# Patient Record
Sex: MALE | Race: WHITE | NOT HISPANIC OR LATINO | Employment: OTHER | URBAN - METROPOLITAN AREA
[De-identification: names, ages, dates, MRNs, and addresses within clinical notes are randomized per-mention and may not be internally consistent; named-entity substitution may affect disease eponyms.]

---

## 2023-08-18 PROBLEM — R22.1 NECK MASS: Status: ACTIVE | Noted: 2023-08-18

## 2023-08-18 PROBLEM — H90.3 SENSORINEURAL HEARING LOSS, ASYMMETRICAL: Status: ACTIVE | Noted: 2023-08-18

## 2023-08-18 PROBLEM — H90.3 BILATERAL SENSORINEURAL HEARING LOSS: Status: ACTIVE | Noted: 2023-08-18

## 2023-08-18 PROBLEM — J34.2 DEVIATED SEPTUM: Status: ACTIVE | Noted: 2023-08-18

## 2023-08-18 RX ORDER — LISINOPRIL 10 MG/1
10 TABLET ORAL
COMMUNITY
Start: 2018-04-13

## 2023-08-18 RX ORDER — ASPIRIN 81 MG/1
81 TABLET ORAL
COMMUNITY
Start: 2015-12-15

## 2023-08-18 RX ORDER — GABAPENTIN 300 MG/1
300 CAPSULE ORAL
COMMUNITY
Start: 2018-05-15

## 2023-08-18 RX ORDER — METFORMIN HYDROCHLORIDE 500 MG/1
500 TABLET ORAL
COMMUNITY
Start: 2018-02-07

## 2023-08-18 RX ORDER — ALLOPURINOL 300 MG/1
TABLET ORAL
COMMUNITY
Start: 2020-06-10

## 2023-10-21 NOTE — PROGRESS NOTES
Subjective   Patient ID: Anthony Escobar is a 73 y.o. male who presents for Follow-up, Hearing Loss, and Cerumen Impaction.  HPI  his 72-year-old male is being seen today for recheck of his years. In December of last year he was noted on a hearing test to have an asymmetric loss with poor discrimination ability especially on his left side. An MRI scan was done showing no central nervous system abnormalities. He did subsequently obtain hearing aids which she has been having some difficulties with due to cerumen buildup as well as fit. He does find that when he wears them they do help somewhat with his understanding. There is been no pain or drainage from his ears. He denies any persistent vertigo. General health has been otherwise stable.       Objective   Physical Exam  EXAMINATION:     GENERAL JOSE.EARANCE: Alert, in no acute distress, normal pitch and clarity of voice, well-developed and nourished, cooperative.     HEAD/FACE: Normocephalic, atraumatic, normal facial movements and strength, no no tenderness to palpation, no lesions noted.     SKIN: Normal turgor, no raised or ulcerative lesions, warm and dry to palpation.     EYES: Extraocular motions intact, no nystagmus noted, pupils equal and reactive to light and accommodation, no conjunctivitis.     EARS: Both ears--external ear anatomy is normal without lesions, both ear canals are completely obstructed by cerumen which is removed microscopically. Once removed the tympanic membrane appeared to be intact with no signs of acute inflammation.     NOSE: No mucous membrane inflammation or purulent discharge is noted. Septal deviation is noted towards the left.     OROPHARYNX/ORAL CAVITY: Mucous members of the oral cavity and pharynx are within normal limits. There did not appear to be any inflammation in the oral cavity. Tongue mobility was normal. There were no mucous membrane pathology is noted.     NECK: No lymphadenopathy is palpated, carotid pulses are intact,  neck is supple with full range of motion, no thyroid abnormalities are noted, trachea is midline, no neck masses are palpated in the anterior neck, posteriorly in the midline of his neck he does have a circumscribed swelling that he states has been present for a number of years      LYMPHATICS: No cervical adenopathy or supraclavicular adenopathy is palpated.     NEUROLOGIC/PSYCH; alert and oriented, cranial nerves are grossly intact, gait is without falling, no motor deficits are noted.    Patient ID: Anthony Escobar is a 73 y.o. male.    Ear cerumen removal    Date/Time: 10/25/2023 8:20 AM    Performed by: Cristofer Luna DMD, MD  Authorized by: Cristofer Luna DMD, MD    Consent:     Consent obtained:  Verbal    Consent given by:  Patient    Risks discussed:  Pain and incomplete removal    Alternatives discussed:  No treatment and alternative treatment  Universal protocol:     Procedure explained and questions answered to patient or proxy's satisfaction: yes      Imaging studies available: no      Required blood products, implants, devices, and special equipment available: no      Patient identity confirmed:  Verbally with patient  Procedure details:     Location:  L ear and R ear    Procedure type: curette      Procedure type comment:  Or suction    Procedure outcomes: cerumen removed    Post-procedure details:     Inspection:  No bleeding and ear canal clear    Hearing quality:  Improved    Procedure completion:  Tolerated  Comments:      I discussed the findings with the patient. Do to the history of cerumen impactions, avoidance of Q tip use and water contamination is advised. Periodic check ups in the office or with the PCP are advised and if recurrent obstructions are noted a scheduled cleaning schedule will be maintained. Ear pain, otorhea, changes in hearing should be reported to the office. For some the use of debrox or baby oil can be helpful as long as thien TM is intact and not  perforated.    Assessment/Plan   Problem List Items Addressed This Visit             ICD-10-CM    Sensorineural hearing loss, asymmetrical H90.3    Bilateral sensorineural hearing loss - Primary H90.3    Deviated septum J34.2     Other Visit Diagnoses         Codes    Bilateral impacted cerumen     H61.23    Relevant Orders    Ear cerumen removal    Auditory discrimination impairment, bilateral     H93.293          I discussed the clinical finds with the patient.  He has no subjective complaints regarding his head and neck health.  There is been no troubles with swallowing or with voice and he has been finding his hearing aids helpful.  He will be due for recheck of his hearing at his next visit.  Past test had shown significant problems with discrimination which should be monitored.  Previous MRI scan was negative.  If there are any difficulties prior to that visit he should contact the office.

## 2023-10-25 ENCOUNTER — OFFICE VISIT (OUTPATIENT)
Dept: OTOLARYNGOLOGY | Facility: CLINIC | Age: 73
End: 2023-10-25
Payer: MEDICARE

## 2023-10-25 VITALS
BODY MASS INDEX: 27.17 KG/M2 | SYSTOLIC BLOOD PRESSURE: 110 MMHG | HEIGHT: 73 IN | WEIGHT: 205 LBS | DIASTOLIC BLOOD PRESSURE: 70 MMHG | HEART RATE: 77 BPM

## 2023-10-25 DIAGNOSIS — J34.2 DEVIATED SEPTUM: ICD-10-CM

## 2023-10-25 DIAGNOSIS — H93.293 AUDITORY DISCRIMINATION IMPAIRMENT, BILATERAL: ICD-10-CM

## 2023-10-25 DIAGNOSIS — H90.3 SENSORINEURAL HEARING LOSS, ASYMMETRICAL: ICD-10-CM

## 2023-10-25 DIAGNOSIS — H90.3 BILATERAL SENSORINEURAL HEARING LOSS: Primary | ICD-10-CM

## 2023-10-25 DIAGNOSIS — H61.23 BILATERAL IMPACTED CERUMEN: ICD-10-CM

## 2023-10-25 PROBLEM — S63.592A TFCC (TRIANGULAR FIBROCARTILAGE COMPLEX) TEAR, LEFT, INITIAL ENCOUNTER: Status: ACTIVE | Noted: 2019-02-11

## 2023-10-25 PROBLEM — M1A.0320: Status: ACTIVE | Noted: 2019-04-30

## 2023-10-25 PROBLEM — N32.0 BLADDER NECK CONTRACTURE: Status: ACTIVE | Noted: 2017-11-27

## 2023-10-25 PROBLEM — E78.1 HYPERTRIGLYCERIDEMIA: Status: ACTIVE | Noted: 2019-10-30

## 2023-10-25 PROBLEM — E11.22 TYPE 2 DIABETES MELLITUS WITH CHRONIC KIDNEY DISEASE (MULTI): Status: ACTIVE | Noted: 2022-06-07

## 2023-10-25 PROBLEM — C61 ADENOCARCINOMA OF PROSTATE (MULTI): Status: ACTIVE | Noted: 2017-11-27

## 2023-10-25 PROBLEM — F10.11 HISTORY OF ETOH ABUSE: Status: ACTIVE | Noted: 2018-01-29

## 2023-10-25 PROBLEM — M47.812 CERVICAL ARTHRITIS: Status: ACTIVE | Noted: 2019-01-30

## 2023-10-25 PROBLEM — N21.0 BLADDER CALCULI: Status: ACTIVE | Noted: 2017-11-27

## 2023-10-25 PROBLEM — N52.9 ERECTILE DYSFUNCTION: Status: ACTIVE | Noted: 2017-11-27

## 2023-10-25 PROBLEM — Z85.46 HISTORY OF PROSTATE CANCER: Status: ACTIVE | Noted: 2018-01-29

## 2023-10-25 PROBLEM — E78.2 MIXED HYPERLIPIDEMIA: Status: ACTIVE | Noted: 2018-02-07

## 2023-10-25 PROBLEM — N18.30 TYPE 2 DIABETES MELLITUS WITH STAGE 3 CHRONIC KIDNEY DISEASE (MULTI): Status: ACTIVE | Noted: 2018-02-07

## 2023-10-25 PROBLEM — E11.22 TYPE 2 DIABETES MELLITUS WITH STAGE 3 CHRONIC KIDNEY DISEASE (MULTI): Status: ACTIVE | Noted: 2018-02-07

## 2023-10-25 PROCEDURE — 3078F DIAST BP <80 MM HG: CPT | Performed by: OTOLARYNGOLOGY

## 2023-10-25 PROCEDURE — 99213 OFFICE O/P EST LOW 20 MIN: CPT | Performed by: OTOLARYNGOLOGY

## 2023-10-25 PROCEDURE — 4010F ACE/ARB THERAPY RXD/TAKEN: CPT | Performed by: OTOLARYNGOLOGY

## 2023-10-25 PROCEDURE — 3074F SYST BP LT 130 MM HG: CPT | Performed by: OTOLARYNGOLOGY

## 2023-10-25 PROCEDURE — 1159F MED LIST DOCD IN RCRD: CPT | Performed by: OTOLARYNGOLOGY

## 2023-10-25 PROCEDURE — 1036F TOBACCO NON-USER: CPT | Performed by: OTOLARYNGOLOGY

## 2023-10-25 PROCEDURE — 69210 REMOVE IMPACTED EAR WAX UNI: CPT | Performed by: OTOLARYNGOLOGY

## 2023-10-25 RX ORDER — ATORVASTATIN CALCIUM 20 MG/1
20 TABLET, FILM COATED ORAL DAILY
COMMUNITY

## 2024-02-22 PROBLEM — H61.20 EXCESSIVE CERUMEN IN EAR CANAL: Status: ACTIVE | Noted: 2024-02-22

## 2024-02-22 PROBLEM — R73.03 PREDIABETES: Status: ACTIVE | Noted: 2024-02-22

## 2024-02-22 PROBLEM — L08.9 LOCALIZED INFECTION OF SKIN: Status: ACTIVE | Noted: 2024-02-22

## 2024-02-22 PROBLEM — H93.299 IMPAIRMENT OF AUDITORY DISCRIMINATION: Status: ACTIVE | Noted: 2024-02-22

## 2024-02-22 PROBLEM — H61.23 BILATERAL IMPACTED CERUMEN: Status: ACTIVE | Noted: 2024-02-22

## 2024-02-22 PROBLEM — Z86.39 HISTORY OF HYPERCHOLESTEROLEMIA: Status: ACTIVE | Noted: 2024-02-22

## 2024-02-22 PROBLEM — Z86.69 HISTORY OF HEARING LOSS: Status: ACTIVE | Noted: 2024-02-22

## 2024-02-22 PROBLEM — R21 RASH: Status: ACTIVE | Noted: 2024-02-22

## 2024-02-22 PROBLEM — H93.13 TINNITUS OF BOTH EARS: Status: ACTIVE | Noted: 2024-02-22

## 2024-02-22 PROBLEM — H81.09 MENIERE'S DISEASE: Status: ACTIVE | Noted: 2024-02-22

## 2024-03-07 ENCOUNTER — CLINICAL SUPPORT (OUTPATIENT)
Dept: AUDIOLOGY | Facility: CLINIC | Age: 74
End: 2024-03-07
Payer: MEDICARE

## 2024-03-07 ENCOUNTER — OFFICE VISIT (OUTPATIENT)
Dept: OTOLARYNGOLOGY | Facility: CLINIC | Age: 74
End: 2024-03-07
Payer: MEDICARE

## 2024-03-07 VITALS — BODY MASS INDEX: 27.17 KG/M2 | WEIGHT: 205 LBS | HEIGHT: 73 IN

## 2024-03-07 DIAGNOSIS — H90.3 SENSORINEURAL HEARING LOSS, ASYMMETRICAL: Primary | ICD-10-CM

## 2024-03-07 DIAGNOSIS — H61.23 BILATERAL IMPACTED CERUMEN: Primary | ICD-10-CM

## 2024-03-07 DIAGNOSIS — H90.3 BILATERAL SENSORINEURAL HEARING LOSS: ICD-10-CM

## 2024-03-07 DIAGNOSIS — H93.13 TINNITUS OF BOTH EARS: ICD-10-CM

## 2024-03-07 DIAGNOSIS — H93.293 AUDITORY DISCRIMINATION IMPAIRMENT, BILATERAL: ICD-10-CM

## 2024-03-07 DIAGNOSIS — H93.293 IMPAIRMENT OF AUDITORY DISCRIMINATION OF BOTH EARS: ICD-10-CM

## 2024-03-07 DIAGNOSIS — H81.03 MENIERE'S DISEASE OF BOTH EARS: ICD-10-CM

## 2024-03-07 PROCEDURE — 99214 OFFICE O/P EST MOD 30 MIN: CPT | Performed by: OTOLARYNGOLOGY

## 2024-03-07 PROCEDURE — 1159F MED LIST DOCD IN RCRD: CPT | Performed by: OTOLARYNGOLOGY

## 2024-03-07 PROCEDURE — 1160F RVW MEDS BY RX/DR IN RCRD: CPT | Performed by: OTOLARYNGOLOGY

## 2024-03-07 PROCEDURE — 92557 COMPREHENSIVE HEARING TEST: CPT | Performed by: AUDIOLOGIST

## 2024-03-07 PROCEDURE — 92567 TYMPANOMETRY: CPT | Performed by: AUDIOLOGIST

## 2024-03-07 PROCEDURE — 69210 REMOVE IMPACTED EAR WAX UNI: CPT | Performed by: OTOLARYNGOLOGY

## 2024-03-07 PROCEDURE — 4010F ACE/ARB THERAPY RXD/TAKEN: CPT | Performed by: OTOLARYNGOLOGY

## 2024-03-07 PROCEDURE — 1036F TOBACCO NON-USER: CPT | Performed by: OTOLARYNGOLOGY

## 2024-03-07 NOTE — PROGRESS NOTES
Subjective   Patient ID: Anthony Escobar is a 73 y.o. male who presents for No chief complaint on file..  HPI  his 73-year-old male is being seen today for recheck of his years. In December of 2022 he was noted on a hearing test to have an asymmetric loss with poor discrimination ability especially on his left side. An MRI scan was done showing no central nervous system abnormalities. He did subsequently obtain hearing aids which she has been having some difficulties with due to cerumen buildup as well as fit. He does find that when he wears them they do help somewhat with his understanding. There is been no pain or drainage from his ears. He denies any persistent vertigo. General health has been otherwise stable.    Review of Systems  A 12 point ROS has been reviewed and are negative for complaint except what is stated in the history of present illness and/or past medical history as noted in the EMR  Objective   Physical Exam  EXAMINATION:     GENERAL JOSE.EARANCE: Alert, in no acute distress, normal pitch and clarity of voice, well-developed and nourished, cooperative.     HEAD/FACE: Normocephalic, atraumatic, normal facial movements and strength, no no tenderness to palpation, no lesions noted.     SKIN: Normal turgor, no raised or ulcerative lesions, warm and dry to palpation.     EYES: Extraocular motions intact, no nystagmus noted, pupils equal and reactive to light and accommodation, no conjunctivitis.     EARS: Both ears--external ear anatomy is normal without lesions, both ear canals are completely obstructed by cerumen which is removed microscopically. Once removed the tympanic membrane appeared to be intact with no signs of acute inflammation.     NOSE: No mucous membrane inflammation or purulent discharge is noted. Septal deviation is noted towards the left.     OROPHARYNX/ORAL CAVITY: Mucous members of the oral cavity and pharynx are within normal limits. There did not appear to be any inflammation in  the oral cavity. Tongue mobility was normal. There were no mucous membrane pathology is noted.     NECK: No lymphadenopathy is palpated, carotid pulses are intact, neck is supple with full range of motion, no thyroid abnormalities are noted, trachea is midline, no neck masses are palpated in the anterior neck, posteriorly in the midline of his neck he does have a circumscribed swelling that he states has been present for a number of years      LYMPHATICS: No cervical adenopathy or supraclavicular adenopathy is palpated.     NEUROLOGIC/PSYCH; alert and oriented, cranial nerves are grossly intact, gait is without falling, no motor deficits are noted.    Patient ID: Anthony Escobar is a 73 y.o. male.    Ear cerumen removal    Date/Time: 3/7/2024 7:59 AM    Performed by: Cristofer Luna DMD, MD  Authorized by: Cristofer Luna DMD, MD    Consent:     Consent obtained:  Verbal    Consent given by:  Patient    Risks discussed:  Pain and incomplete removal    Alternatives discussed:  No treatment and alternative treatment  Universal protocol:     Procedure explained and questions answered to patient or proxy's satisfaction: yes      Imaging studies available: no      Required blood products, implants, devices, and special equipment available: no      Patient identity confirmed:  Verbally with patient  Procedure details:     Location:  L ear and R ear    Procedure type: curette      Procedure type comment:  Or suction    Procedure outcomes: cerumen removed    Post-procedure details:     Inspection:  No bleeding and ear canal clear    Hearing quality:  Improved    Procedure completion:  Tolerated well, no immediate complications    His audiogram today in both ears revealed still normal hearing up through 500 Hz then a sloping mild to moderately severe sensorineural hearing loss is noted slight asymmetry left lower than right in the mid frequencies primarily.  Discrimination scores are 60% on the right ear and 55 dB which is  decreased from 1 year ago and 24% on the left ear which is also decreased.  Discrimination scores were at 50% on the left last year 72% on the right tympanograms are normal  Assessment/Plan   Problem List Items Addressed This Visit             ICD-10-CM    Bilateral sensorineural hearing loss H90.3    Bilateral impacted cerumen - Primary H61.23    Impairment of auditory discrimination H93.299    Meniere's disease H81.09    Tinnitus of both ears H93.13     Other Visit Diagnoses         Codes    Auditory discrimination impairment, bilateral     H93.293          I discussed the clinical findings with the patient. From the use of a hearing aid, it is common for cerumen to build up in the outer portion of the ear canals which can lead to obstruction and a decrease in the efficiency of the hearing aid. As such, ear cleaning at various intervals becomes necessary. The use of external hand cream to help lubricate the outer aspect of the ear canal and periodic use of oil drops such as baby oil or sweet oil to lubricate the skin of the ear canal can at times help self extrusion. Before replacing the hearing aids however it is advised to make sure that the ear canal is not excessively wet. In those with excessive cerumen routine follow-ups for cleaning would be advised could correlate to their yearly hearing examinations.    I discussed the findings with the patient. In addition to significant hearing loss, there is a significant decrease in discrimination of sound based upon Word clarity on speech testing. This is a part of hearing loss that is difficult to correct with hearing aid use alone. This is still the only treatment that can be offered unless, in the right candidate, cochlear implantation is considered do to discrimination below 40%.  Hearing should be monitored on a yearly basis. If there is a significant difference between the 2 ears that is increasing, consultation with neurotology would be recommended.        Cristofer Luna DMD, MD 03/07/24 7:57 AM

## 2024-03-07 NOTE — PROGRESS NOTES
[facility]  AUDIOMETRIC EVALUATION      Name:  Anthony Escobar   :  1950  Age:  73 y.o.  Date of Evaluation:  24    HISTORY    Anthony Escobar is seen today at the request of Cristofer Luna M.D., SUMA., F.A.C.S.    EVALUATION  See scanned audiogram in Media and included at the end of this report.    RESULTS    Otoscopic Evaluation:  Right Ear:  excessive but non-occluding cerumen   Left Ear:  excessive but non-occluding cerumen    Tympanometry:   Right Ear:  Type A, consistent with normal eardrum mobility and middle ear pressure   Left Ear:  Type A, consistent with normal eardrum mobility and middle ear pressure    Ipsilateral acoustic reflexes were not completed    Pure Tone Audiometry:    Right Ear:  normal to severe sensorineural hearing loss  Left Ear:  normal to severe sensorineural hearing loss       Speech Audiometry:    Right Ear:  fair in quiet at an elevated presentation level  Left Ear:  poor in quiet at an elevated presentation level  Speech reception thresholds were in good agreement with pure tone testing.    DISCUSSION  Results were relayed to Cristofer Luna M.D., SUMA., F.A.C.S.    APPOINTMENT TIME  8:30-9:00am      Wil Muniz  Doctor of Audiology  Senior Audiologist

## 2024-07-03 NOTE — PROGRESS NOTES
Subjective   Patient ID: Anthony Escobar is a 73 y.o. male who presents for Follow-up.  HPI  73-year-old male is being seen back in the office for 6-month recheck.  He has a history of asymmetric sensorineural hearing loss with poor discrimination ability especially on his left side.  His last audiogram was from March of this year.  Previous MRI scan done to assess this was negative for central nervous system problems.  He does wear hearing aids but not frequently   although he does obstruct from cerumen requiring debridement.  He had no difficulties with vertigo and his general health has been stable.  Review of Systems  A 12 point ROS has been reviewed and are negative for complaint except what is stated in the history of present illness and/or past medical history as noted in the EMR  Active Ambulatory Problems     Diagnosis Date Noted    Sensorineural hearing loss, asymmetrical 08/18/2023    Bilateral sensorineural hearing loss 08/18/2023    Neck mass 08/18/2023    Deviated septum 08/18/2023    Adenocarcinoma of prostate (Multi) 11/27/2017    Bladder calculi 11/27/2017    Bladder neck contracture 11/27/2017    Cervical arthritis 01/30/2019    Erectile dysfunction 11/27/2017    History of ETOH abuse 01/29/2018    History of prostate cancer 01/29/2018    Hypertriglyceridemia 10/30/2019    Mixed hyperlipidemia 02/07/2018    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    TFCC (triangular fibrocartilage complex) tear, left, initial encounter 02/11/2019    Type 2 diabetes mellitus with stage 3 chronic kidney disease (Multi) 02/07/2018    Bilateral impacted cerumen 02/22/2024    Excessive cerumen in ear canal 02/22/2024    History of hearing loss 02/22/2024    History of hypercholesterolemia 02/22/2024    Impairment of auditory discrimination 02/22/2024    Localized infection of skin 02/22/2024    Meniere's disease 02/22/2024    Prediabetes 02/22/2024    Rash 02/22/2024    Tinnitus of both ears 02/22/2024  "    Resolved Ambulatory Problems     Diagnosis Date Noted    No Resolved Ambulatory Problems     Past Medical History:   Diagnosis Date    Meniere's disease, unspecified ear     Personal history of malignant neoplasm of prostate     Personal history of other diseases of the musculoskeletal system and connective tissue 06/23/2021    Personal history of other diseases of the nervous system and sense organs     Personal history of other diseases of urinary system     Personal history of other drug therapy     Personal history of other endocrine, nutritional and metabolic disease          Current Outpatient Medications:     allopurinol (Zyloprim) 300 mg tablet, Take by mouth., Disp: , Rfl:     aspirin 81 mg EC tablet, Take 1 tablet (81 mg) by mouth., Disp: , Rfl:     atorvastatin (Lipitor) 20 mg tablet, Take 1 tablet (20 mg) by mouth once daily., Disp: , Rfl:     ciclopirox (Penlac) 8 % solution, APPLY TO AFFECTED NAILS ONCE NIGHTLY., Disp: , Rfl:     econazole nitrate 1 % cream, APPLY TO AFFECTED AREAS OF BOTH ANKLES / FEET TWICE DAILY FOR 6 WEEKS., Disp: , Rfl:     gabapentin (Neurontin) 300 mg capsule, Take 1 capsule (300 mg) by mouth., Disp: , Rfl:     lisinopril 10 mg tablet, Take 1 tablet (10 mg) by mouth., Disp: , Rfl:     metFORMIN (Glucophage) 500 mg tablet, Take 1 tablet (500 mg) by mouth., Disp: , Rfl:    Social History     Tobacco Use    Smoking status: Never    Smokeless tobacco: Never   Substance Use Topics    Alcohol use: Not Currently        No Known Allergies   Height 1.854 m (6' 1\"), weight 93 kg (205 lb).    Objective   Physical Exam  EXAMINATION:     GENERAL JOSE.EARANCE: Alert, in no acute distress, normal pitch and clarity of voice, well-developed and nourished, cooperative.     HEAD/FACE: Normocephalic, atraumatic, normal facial movements and strength, no no tenderness to palpation, no lesions noted.     SKIN: Normal turgor, no raised or ulcerative lesions, warm and dry to palpation.     EYES: " Extraocular motions intact, no nystagmus noted, pupils equal and reactive to light and accommodation, no conjunctivitis.     EARS: Both ears--external ear anatomy is normal without lesions, both ear canals are completely obstructed by cerumen which is removed microscopically. Once removed the tympanic membrane appeared to be intact with no signs of acute inflammation.     NOSE: No mucous membrane inflammation or purulent discharge is noted. Septal deviation is noted towards the left.     OROPHARYNX/ORAL CAVITY: Mucous members of the oral cavity and pharynx are within normal limits. There did not appear to be any inflammation in the oral cavity. Tongue mobility was normal. There were no mucous membrane pathology is noted.     NECK: No lymphadenopathy is palpated, carotid pulses are intact, neck is supple with full range of motion, no thyroid abnormalities are noted, trachea is midline, no neck masses are palpated in the anterior neck, posteriorly in the midline of his neck he does have a circumscribed swelling that he states has been present for a number of years      LYMPHATICS: No cervical adenopathy or supraclavicular adenopathy is palpated.     NEUROLOGIC/PSYCH; alert and oriented, cranial nerves are grossly intact, gait is without falling, no motor deficits are noted.    Patient ID: Anthony Escobar is a 73 y.o. male.    Ear cerumen removal    Date/Time: 7/8/2024 8:16 AM    Performed by: Cristofer Luna DMD, MD  Authorized by: Cristofer Luna DMD, MD    Consent:     Consent obtained:  Verbal    Consent given by:  Patient    Risks discussed:  Pain and incomplete removal    Alternatives discussed:  No treatment and alternative treatment  Universal protocol:     Procedure explained and questions answered to patient or proxy's satisfaction: yes      Imaging studies available: no      Required blood products, implants, devices, and special equipment available: no      Patient identity confirmed:  Verbally with  patient  Procedure details:     Location:  L ear and R ear    Procedure type: curette      Procedure type comment:  Or suction    Procedure outcomes: cerumen removed    Post-procedure details:     Inspection:  No bleeding and ear canal clear    Hearing quality:  Improved    Procedure completion:  Tolerated well, no immediate complications    Assessment/Plan   Problem List Items Addressed This Visit             ICD-10-CM    Sensorineural hearing loss, asymmetrical - Primary H90.3    Bilateral sensorineural hearing loss H90.3    Deviated septum J34.2    Bilateral impacted cerumen H61.23    Tinnitus of both ears H93.13     I discussed the clinical finds with the patient.  He seems to be having slightly less buildup compared to previous exams.  He does have poor discrimination ability especially on his left ear and it does not appear that he is been wearing his hearing aids much.  There is been no vertigo issues or discomfort.  Organ to see how he does in 6 months and in the spring of the next year he should have a hearing follow-up.  He did undergo some skin surgery on the posterior aspect of his neck which likely was negative for any cancerous development as per his history.       Cristofer Luna DMD, MD 07/08/24 8:18 AM

## 2024-07-08 ENCOUNTER — APPOINTMENT (OUTPATIENT)
Dept: OTOLARYNGOLOGY | Facility: CLINIC | Age: 74
End: 2024-07-08
Payer: MEDICARE

## 2024-07-08 VITALS — WEIGHT: 205 LBS | BODY MASS INDEX: 27.17 KG/M2 | HEIGHT: 73 IN

## 2024-07-08 DIAGNOSIS — H61.23 BILATERAL IMPACTED CERUMEN: ICD-10-CM

## 2024-07-08 DIAGNOSIS — H90.3 SENSORINEURAL HEARING LOSS, ASYMMETRICAL: Primary | ICD-10-CM

## 2024-07-08 DIAGNOSIS — J34.2 DEVIATED SEPTUM: ICD-10-CM

## 2024-07-08 DIAGNOSIS — H93.13 TINNITUS OF BOTH EARS: ICD-10-CM

## 2024-07-08 DIAGNOSIS — H90.3 BILATERAL SENSORINEURAL HEARING LOSS: ICD-10-CM

## 2024-07-08 PROCEDURE — 69210 REMOVE IMPACTED EAR WAX UNI: CPT | Performed by: OTOLARYNGOLOGY

## 2024-07-08 PROCEDURE — 99213 OFFICE O/P EST LOW 20 MIN: CPT | Performed by: OTOLARYNGOLOGY

## 2024-07-08 PROCEDURE — 1159F MED LIST DOCD IN RCRD: CPT | Performed by: OTOLARYNGOLOGY

## 2024-07-08 PROCEDURE — 1036F TOBACCO NON-USER: CPT | Performed by: OTOLARYNGOLOGY

## 2024-07-08 PROCEDURE — 1160F RVW MEDS BY RX/DR IN RCRD: CPT | Performed by: OTOLARYNGOLOGY

## 2024-07-08 PROCEDURE — 4010F ACE/ARB THERAPY RXD/TAKEN: CPT | Performed by: OTOLARYNGOLOGY

## 2024-07-08 RX ORDER — ECONAZOLE NITRATE 10 MG/G
CREAM TOPICAL
COMMUNITY

## 2024-07-08 RX ORDER — CICLOPIROX 80 MG/ML
SOLUTION TOPICAL
COMMUNITY

## 2025-01-04 NOTE — PROGRESS NOTES
Subjective   Patient ID: Anthony Escobar is a 74 y.o. male who presents for No chief complaint on file..  HPI  74-year-old male is being seen back in the office for 6-month recheck.  He has a history of asymmetric sensorineural hearing loss with poor discrimination ability especially on his left side.  His last audiogram was from March of this year.  Previous MRI scan done to assess this was negative for central nervous system problems.  He does wear hearing aids but not frequently   although he does obstruct from cerumen requiring debridement.  He had no difficulties with vertigo and his general health has been stable. According to his primary care doctors notes he has been exercising multiple days per week, he has had control of gout and also control of his diabetes on metformin.  Hypertension is controlled.  And peripheral neuropathy is controlled on gabapentin.  Review of Systems   A 12 point ROS  has been reviewed and are negative for complaint except for what is stated in the history of present illness and /or for past medical history as noted in the EMR.    Objective   Physical Exam  EXAMINATION:     GENERAL JOSE.EARANCE: Alert, in no acute distress, normal pitch and clarity of voice, well-developed and nourished, cooperative.     HEAD/FACE: Normocephalic, atraumatic, normal facial movements and strength, no no tenderness to palpation, no lesions noted.     SKIN: Normal turgor, no raised or ulcerative lesions, warm and dry to palpation.     EYES: Extraocular motions intact, no nystagmus noted, pupils equal and reactive to light and accommodation, no conjunctivitis.     EARS: Both ears--external ear anatomy is normal without lesions, both ear canals are completely obstructed by cerumen which is removed microscopically. Once removed the tympanic membrane appeared to be intact with no signs of acute inflammation.     NOSE: No mucous membrane inflammation or purulent discharge is noted. Septal deviation is noted  towards the left.     OROPHARYNX/ORAL CAVITY: Mucous members of the oral cavity and pharynx are within normal limits. There did not appear to be any inflammation in the oral cavity. Tongue mobility was normal. There were no mucous membrane pathology is noted.     NECK: No lymphadenopathy is palpated, carotid pulses are intact, neck is supple with full range of motion, no thyroid abnormalities are noted, trachea is midline, no neck masses are palpated in the anterior neck, posteriorly in the midline of his neck he does have a circumscribed swelling that he states has been present for a number of years      LYMPHATICS: No cervical adenopathy or supraclavicular adenopathy is palpated.     NEUROLOGIC/PSYCH; alert and oriented, cranial nerves are grossly intact, gait is without falling, no motor deficits are noted.    Patient ID: Anthony Escobar is a 74 y.o. male.    Ear cerumen removal    Date/Time: 1/8/2025 8:12 AM    Performed by: Cristofer Luna DMD, MD  Authorized by: Cristofer Luna DMD, MD    Consent:     Consent obtained:  Verbal    Consent given by:  Patient    Risks discussed:  Pain and incomplete removal    Alternatives discussed:  No treatment and alternative treatment  Universal protocol:     Procedure explained and questions answered to patient or proxy's satisfaction: yes      Imaging studies available: no      Required blood products, implants, devices, and special equipment available: no      Patient identity confirmed:  Verbally with patient  Procedure details:     Location:  L ear and R ear    Procedure type: curette      Procedure type comment:  Or suction    Procedure outcomes: cerumen removed    Post-procedure details:     Inspection:  No bleeding and ear canal clear    Hearing quality:  Improved    Procedure completion:  Tolerated well, no immediate complications       Assessment/Plan   Problem List Items Addressed This Visit             ICD-10-CM    Sensorineural hearing loss, asymmetrical -  Primary H90.3    Bilateral sensorineural hearing loss H90.3    Bilateral impacted cerumen H61.23    Tinnitus of both ears H93.13     Other Visit Diagnoses         Codes    Auditory discrimination impairment, bilateral     H93.293             I discussed the findings with the patient. Do to the history of cerumen impactions, avoidance of Q tip use and water contamination is advised. Periodic check ups in the office or with the PCP are advised and if recurrent obstructions are noted a scheduled cleaning schedule will be maintained. Ear pain, otorhea, changes in hearing should be reported to the office. For some the use of debrox or baby oil can be helpful as long as thien TM is intact and not perforated.  At his next visit an audiogram should be done as well.  Cristofer Luna DMD, MD 01/04/25 2:09 PM

## 2025-01-08 ENCOUNTER — APPOINTMENT (OUTPATIENT)
Dept: OTOLARYNGOLOGY | Facility: CLINIC | Age: 75
End: 2025-01-08
Payer: MEDICARE

## 2025-01-08 VITALS — HEIGHT: 73 IN | BODY MASS INDEX: 27.17 KG/M2 | WEIGHT: 205 LBS

## 2025-01-08 DIAGNOSIS — H61.23 BILATERAL IMPACTED CERUMEN: ICD-10-CM

## 2025-01-08 DIAGNOSIS — H93.293 AUDITORY DISCRIMINATION IMPAIRMENT, BILATERAL: ICD-10-CM

## 2025-01-08 DIAGNOSIS — H90.3 BILATERAL SENSORINEURAL HEARING LOSS: ICD-10-CM

## 2025-01-08 DIAGNOSIS — H93.13 TINNITUS OF BOTH EARS: ICD-10-CM

## 2025-01-08 DIAGNOSIS — H90.3 SENSORINEURAL HEARING LOSS, ASYMMETRICAL: Primary | ICD-10-CM

## 2025-01-08 PROCEDURE — 69210 REMOVE IMPACTED EAR WAX UNI: CPT | Performed by: OTOLARYNGOLOGY

## 2025-01-08 PROCEDURE — 1160F RVW MEDS BY RX/DR IN RCRD: CPT | Performed by: OTOLARYNGOLOGY

## 2025-01-08 PROCEDURE — 3008F BODY MASS INDEX DOCD: CPT | Performed by: OTOLARYNGOLOGY

## 2025-01-08 PROCEDURE — 1036F TOBACCO NON-USER: CPT | Performed by: OTOLARYNGOLOGY

## 2025-01-08 PROCEDURE — 1159F MED LIST DOCD IN RCRD: CPT | Performed by: OTOLARYNGOLOGY

## 2025-01-08 PROCEDURE — 99213 OFFICE O/P EST LOW 20 MIN: CPT | Performed by: OTOLARYNGOLOGY

## 2025-01-08 PROCEDURE — 4010F ACE/ARB THERAPY RXD/TAKEN: CPT | Performed by: OTOLARYNGOLOGY

## 2025-07-05 NOTE — PROGRESS NOTES
PATIENT ID  Anthony Escobar IS A 74 y.o. male WHO PRESENTS FOR      HPI   This 74-year-old male last seen in the office in January who is generally seen every 6 months for ear debridement and who has an asymmetric sensorineural hearing loss with poor discrimination ability especially on his left.  Previous MRI scan was negative for central nervous system abnormalities.  He wears bilateral hearing aids periodically.  He does have a tendency for cerumen obstruction further compromising hearing.  He has had no vertigo issues and his general health has been stable.  His PCP follows him for gout and for diabetes mellitus treated with metformin.  Hypertension is controlled on medication as well.  He also has peripheral neuropathy controlled on gabapentin.  He is recently has some difficulties urologically although this been no recurrence of his carcinoma.  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  ROS    A 12 point ROS  has been reviewed and are negative for complaint except for what is stated in the history of present illness and /or for past medical history as noted in the EMR.     Medical History[1]    Current Medications[2]    Social History[3]    RX Allergies[4]     vitals were not taken for this visit.     PHYSICAL EXAM  XAMINATION:     GENERAL JOSE.EARANCE: Alert, in no acute distress, normal pitch and clarity of voice, well-developed and nourished, cooperative.     HEAD/FACE: Normocephalic, atraumatic, normal facial movements and strength, no no tenderness to palpation, no lesions noted.     SKIN: Normal turgor, no raised or ulcerative lesions, warm and dry to palpation.     EYES: Extraocular motions intact, no nystagmus noted, pupils equal and reactive to light and accommodation, no conjunctivitis.     EARS: Both ears--external ear anatomy is normal without lesions, both ear  canals are completely obstructed by cerumen which is removed microscopically. Once removed the tympanic membrane appeared to be intact with no signs of acute inflammation.     NOSE: No mucous membrane inflammation or purulent discharge is noted. Septal deviation is noted towards the left.     OROPHARYNX/ORAL CAVITY: Mucous members of the oral cavity and pharynx are within normal limits. There did not appear to be any inflammation in the oral cavity. Tongue mobility was normal. There were no mucous membrane pathology is noted.     NECK: No lymphadenopathy is palpated, carotid pulses are intact, neck is supple with full range of motion, no thyroid abnormalities are noted, trachea is midline, no neck masses are palpated in the anterior neck, posteriorly in the midline of his neck he does have a circumscribed swelling that he states has been present for a number of years      LYMPHATICS: No cervical adenopathy or supraclavicular adenopathy is palpated.     NEUROLOGIC/PSYCH; alert and oriented, cranial nerves are grossly intact, gait is without falling, no motor deficits are noted.    Patient ID: Anthony Escobar is a 74 y.o. male.    Ear cerumen removal    Date/Time: 7/9/2025 9:08 AM    Performed by: Cristofer Luna DMD, MD  Authorized by: Cristofer Luna DMD, MD    Consent:     Consent obtained:  Verbal    Consent given by:  Patient    Risks discussed:  Pain and incomplete removal    Alternatives discussed:  No treatment and alternative treatment  Universal protocol:     Procedure explained and questions answered to patient or proxy's satisfaction: yes      Imaging studies available: no      Required blood products, implants, devices, and special equipment available: no      Patient identity confirmed:  Verbally with patient  Procedure details:     Location:  L ear and R ear    Procedure type: curette      Procedure type comment:  Or suction    Procedure outcomes: cerumen removed    Post-procedure details:      Inspection:  No bleeding and ear canal clear    Hearing quality:  Improved    Procedure completion:  Tolerated well, no immediate complications  Comments:      Both ears were examined microscopically.  There was obstructive cerumen bilaterally which was removed with wax loop primarily.  The tympanic membrane was normal in appearance.  No middle ear disease was noted.    His audiogram today continues to show low normal to mild sensorineural hearing loss in both ears up to 7 and 50 Hz then a moderate to severe sloping loss is noted slight asymmetry left lower than right from 1000 to 4000 Hz.  Discrimination scores are at 48% on the right at 60 dB 24% on the left at 75 dB tympanograms are normal.  The tonal thresholds are unchanged there is a decreased further in discrimination on the right ear.      ASSESSMENT/PLAN  Problem List Items Addressed This Visit           ICD-10-CM    Sensorineural hearing loss, asymmetrical - Primary H90.3    Bilateral sensorineural hearing loss H90.3    Bilateral impacted cerumen H61.23    Tinnitus of both ears H93.13     Other Visit Diagnoses         Codes      Auditory discrimination impairment, bilateral     H93.293        I discussed the clinical finds with the patient.  He has had further decrease in his discrimination ability with no change in his tonal thresholds.  I spent some time discussing with him cochlear implants which he would be a candidate for.  If he wishes to investigate that he to be sent for an evaluation by the cochlear team.  He has not been wearing his hearing aids and if he chooses not to and look into implants at this point he should at least see his audiologist to have his hearing aids adjusted and try to wear them.  I made him aware the fact that even with his hearing aids on his comprehension would be still limited.  He continues to have ceruminous buildup requiring 6-month checkups for debridement.  At the present time he seems to be concentrating on his  urological issues and at this point is not ready to look into implants.  As such we will continue yearly hearing tests and if he does at 1 point wish to investigate this further then appropriate consultation will be made.         [1]   Past Medical History:  Diagnosis Date    Ear problems     Meniere's disease, unspecified ear     Menieres disease    Personal history of malignant neoplasm of prostate     History of malignant neoplasm of prostate    Personal history of other diseases of the musculoskeletal system and connective tissue 06/23/2021    History of gout    Personal history of other diseases of the nervous system and sense organs     History of hearing loss    Personal history of other diseases of urinary system     History of bladder stone    Personal history of other drug therapy     COVID-19 vaccine series completed    Personal history of other endocrine, nutritional and metabolic disease     H/O hypercholesterolemia    Prediabetes     Prediabetes   [2]   Current Outpatient Medications:     allopurinol (Zyloprim) 300 mg tablet, Take by mouth., Disp: , Rfl:     aspirin 81 mg EC tablet, Take 1 tablet (81 mg) by mouth., Disp: , Rfl:     atorvastatin (Lipitor) 20 mg tablet, Take 1 tablet (20 mg) by mouth once daily., Disp: , Rfl:     ciclopirox (Penlac) 8 % solution, APPLY TO AFFECTED NAILS ONCE NIGHTLY., Disp: , Rfl:     econazole nitrate 1 % cream, APPLY TO AFFECTED AREAS OF BOTH ANKLES / FEET TWICE DAILY FOR 6 WEEKS., Disp: , Rfl:     gabapentin (Neurontin) 300 mg capsule, Take 1 capsule (300 mg) by mouth., Disp: , Rfl:     lisinopril 10 mg tablet, Take 1 tablet (10 mg) by mouth., Disp: , Rfl:     metFORMIN (Glucophage) 500 mg tablet, Take 1 tablet (500 mg) by mouth., Disp: , Rfl:   [3]   Social History  Tobacco Use    Smoking status: Never    Smokeless tobacco: Former     Types: Snuff, Chew     Quit date: 1/1/2003    Tobacco comments:     Chewed playing baseball   Substance Use Topics    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

## 2025-07-09 ENCOUNTER — APPOINTMENT (OUTPATIENT)
Dept: OTOLARYNGOLOGY | Facility: CLINIC | Age: 75
End: 2025-07-09
Payer: MEDICARE

## 2025-07-09 ENCOUNTER — APPOINTMENT (OUTPATIENT)
Dept: AUDIOLOGY | Facility: CLINIC | Age: 75
End: 2025-07-09
Payer: MEDICARE

## 2025-07-09 DIAGNOSIS — H90.3 BILATERAL SENSORINEURAL HEARING LOSS: ICD-10-CM

## 2025-07-09 DIAGNOSIS — H61.23 BILATERAL IMPACTED CERUMEN: ICD-10-CM

## 2025-07-09 DIAGNOSIS — H90.3 SENSORINEURAL HEARING LOSS, ASYMMETRICAL: Primary | ICD-10-CM

## 2025-07-09 DIAGNOSIS — H61.23 EXCESSIVE CERUMEN IN BOTH EAR CANALS: ICD-10-CM

## 2025-07-09 DIAGNOSIS — H93.293 AUDITORY DISCRIMINATION IMPAIRMENT, BILATERAL: ICD-10-CM

## 2025-07-09 DIAGNOSIS — H93.13 TINNITUS OF BOTH EARS: ICD-10-CM

## 2025-07-09 DIAGNOSIS — H93.293 IMPAIRMENT OF AUDITORY DISCRIMINATION OF BOTH EARS: ICD-10-CM

## 2025-07-09 PROCEDURE — 99214 OFFICE O/P EST MOD 30 MIN: CPT | Performed by: OTOLARYNGOLOGY

## 2025-07-09 PROCEDURE — 69210 REMOVE IMPACTED EAR WAX UNI: CPT | Performed by: OTOLARYNGOLOGY

## 2025-07-09 PROCEDURE — 92567 TYMPANOMETRY: CPT | Performed by: AUDIOLOGIST

## 2025-07-09 PROCEDURE — 1159F MED LIST DOCD IN RCRD: CPT | Performed by: OTOLARYNGOLOGY

## 2025-07-09 PROCEDURE — 1160F RVW MEDS BY RX/DR IN RCRD: CPT | Performed by: OTOLARYNGOLOGY

## 2025-07-09 PROCEDURE — 92557 COMPREHENSIVE HEARING TEST: CPT | Performed by: AUDIOLOGIST

## 2025-07-09 PROCEDURE — 4010F ACE/ARB THERAPY RXD/TAKEN: CPT | Performed by: OTOLARYNGOLOGY

## 2025-07-09 PROCEDURE — 1036F TOBACCO NON-USER: CPT | Performed by: OTOLARYNGOLOGY

## 2025-07-09 RX ORDER — TROSPIUM CHLORIDE 20 MG/1
20 TABLET, FILM COATED ORAL 2 TIMES DAILY
COMMUNITY

## 2025-07-09 NOTE — PROGRESS NOTES
[facility]UD[facility]  AUDIOMETRIC EVALUATION      Name:  Anthony Escobar   :  1950  Age:  74 y.o.  Date of Evaluation:  25    HISTORY    Anthony Escobar is seen today at the request of Cristofer Luna M.D., SUMA., F.A.C.S.  The patient is an established patient monitoring hearing loss progression.    EVALUATION  See scanned audiogram in Media and included at the end of this report.    RESULTS    Otoscopic Evaluation:  Right Ear:  excessive but non-occluding cerumen   Left Ear:  excessive but non-occluding cerumen    Tympanometry:   Right Ear:  Type A, consistent with normal eardrum mobility and middle ear pressure   Left Ear:  Type A, consistent with normal eardrum mobility and middle ear pressure    Ipsilateral acoustic reflexes were not completed    Pure Tone Audiometry:    Right Ear:  normal to severe sensorineural hearing loss  Left Ear:  normal to profound sensorineural hearing loss       Speech Audiometry:    Right Ear:  poor in quiet at an elevated presentation level  Left Ear:  poor in quiet at an elevated presentation level  Speech reception thresholds were in good agreement with pure tone testing.    DISCUSSION  Results were relayed to Cristofer Luna M.D., SUMA., F.A.C.S.    APPOINTMENT TIME  8:30am-9:00am     Wil Muniz  Doctor of Audiology  Senior Audiologist

## 2026-01-12 ENCOUNTER — APPOINTMENT (OUTPATIENT)
Dept: OTOLARYNGOLOGY | Facility: CLINIC | Age: 76
End: 2026-01-12
Payer: MEDICARE